# Patient Record
(demographics unavailable — no encounter records)

---
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
## 2024-12-09 NOTE — HISTORY OF PRESENT ILLNESS
[FreeTextEntry1] : ADIEL HSV [de-identified] : Santy is a 21m old F here for f/u for visit in the ED on 12/6 for HSV. Initially was seen on 12/2 at ED and was swabbed, although favored irritant dermatitis. Swab + for HSV, pt was told to start acyvlovir 200mg (5mL 4x/day) and use zinc oxide topically. Pt returned on 12/6, mom stating no improvement. Pt was set up to see us outpatient today for further evaluation. Of note, mom endorsing + primary HSV as well (cutaneous blisters/itchiness and fevers, swabbed and tx for HSV 1), 3 weeks prior to pts sx. Dad denies similar sx however does endorse recent burning on urination. Mom was tested for all other STDs and was found to be negative. Lab work shown at visit. Mom and Dad endorse that pt was unable to complete course of acyclovir as bottle with medication was not "full" and has only been taking medication for about 3 days. Parents are concerned as pt is not improving. Not having systemic sx at this time. Per mom, last fever was prior to last ED visit on 12/6.  12/6 ED VISIT :9-month-old female with past medical history of HSV vaginal rash diagnosed 2 days ago started on acyclovir 200 mg 4 times daily presenting today for increased fussiness and increased possible pain vaginal area.  Slightly decreased p.o. intake however still tolerating.  On and off fever subjectively but never measured.  No nausea or vomiting no diarrhea.  Also noticed this mass protruding from the vaginal area today therefore came into the ER.  Has been giving the antivirals as prescribed mixed with milk.  No coughing.  No difficulty breathing.  No abdominal pain.  Has been applying zinc oxide in the diaper area. Has been also applying apple cider/vinegar on the area. Vital signs within normal limits. No fever here. Physical exam shows well-appearing female in no acute distress.  No acute respiratory distress noted.  Abdomen soft and nontender.   exam shows HSV lesions around the vaginal area and also on the buttocks.  There is a erythematous mass protruding from vaginal introitus.  Patient most likely uncomfortable from the rash itself is can be painful.  Will instruct to change diapers as much as they can.  Introital mass seems to be clitoral hameed swollen due to HSV lesion affecting the area. There is wide involvement of the HSV lesions around the vaginal area and also on the buttocks. Instructed parent to not use the vinegar/apple cider to clean the area. Instead instructed to use soapy water. Will have her continue to take acyclovir and f/u with derm.
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
## 2024-12-09 NOTE — HISTORY OF PRESENT ILLNESS
[FreeTextEntry1] : ADIEL HSV [de-identified] : Santy is a 21m old F here for f/u for visit in the ED on 12/6 for HSV. Initially was seen on 12/2 at ED and was swabbed, although favored irritant dermatitis. Swab + for HSV, pt was told to start acyvlovir 200mg (5mL 4x/day) and use zinc oxide topically. Pt returned on 12/6, mom stating no improvement. Pt was set up to see us outpatient today for further evaluation. Of note, mom endorsing + primary HSV as well (cutaneous blisters/itchiness and fevers, swabbed and tx for HSV 1), 3 weeks prior to pts sx. Dad denies similar sx however does endorse recent burning on urination. Mom was tested for all other STDs and was found to be negative. Lab work shown at visit. Mom and Dad endorse that pt was unable to complete course of acyclovir as bottle with medication was not "full" and has only been taking medication for about 3 days. Parents are concerned as pt is not improving. Not having systemic sx at this time. Per mom, last fever was prior to last ED visit on 12/6.  12/6 ED VISIT :9-month-old female with past medical history of HSV vaginal rash diagnosed 2 days ago started on acyclovir 200 mg 4 times daily presenting today for increased fussiness and increased possible pain vaginal area.  Slightly decreased p.o. intake however still tolerating.  On and off fever subjectively but never measured.  No nausea or vomiting no diarrhea.  Also noticed this mass protruding from the vaginal area today therefore came into the ER.  Has been giving the antivirals as prescribed mixed with milk.  No coughing.  No difficulty breathing.  No abdominal pain.  Has been applying zinc oxide in the diaper area. Has been also applying apple cider/vinegar on the area. Vital signs within normal limits. No fever here. Physical exam shows well-appearing female in no acute distress.  No acute respiratory distress noted.  Abdomen soft and nontender.   exam shows HSV lesions around the vaginal area and also on the buttocks.  There is a erythematous mass protruding from vaginal introitus.  Patient most likely uncomfortable from the rash itself is can be painful.  Will instruct to change diapers as much as they can.  Introital mass seems to be clitoral hameed swollen due to HSV lesion affecting the area. There is wide involvement of the HSV lesions around the vaginal area and also on the buttocks. Instructed parent to not use the vinegar/apple cider to clean the area. Instead instructed to use soapy water. Will have her continue to take acyclovir and f/u with derm.

## 2024-12-09 NOTE — HISTORY OF PRESENT ILLNESS
[FreeTextEntry1] : ADIEL HSV [de-identified] : Santy is a 21m old F here for f/u for visit in the ED on 12/6 for HSV. Initially was seen on 12/2 at ED and was swabbed, although favored irritant dermatitis. Swab + for HSV, pt was told to start acyvlovir 200mg (5mL 4x/day) and use zinc oxide topically. Pt returned on 12/6, mom stating no improvement. Pt was set up to see us outpatient today for further evaluation. Of note, mom endorsing + primary HSV as well (cutaneous blisters/itchiness and fevers, swabbed and tx for HSV 1), 3 weeks prior to pts sx. Dad denies similar sx however does endorse recent burning on urination. Mom was tested for all other STDs and was found to be negative. Lab work shown at visit. Mom and Dad endorse that pt was unable to complete course of acyclovir as bottle with medication was not "full" and has only been taking medication for about 3 days. Parents are concerned as pt is not improving. Not having systemic sx at this time. Per mom, last fever was prior to last ED visit on 12/6.  12/6 ED VISIT :9-month-old female with past medical history of HSV vaginal rash diagnosed 2 days ago started on acyclovir 200 mg 4 times daily presenting today for increased fussiness and increased possible pain vaginal area.  Slightly decreased p.o. intake however still tolerating.  On and off fever subjectively but never measured.  No nausea or vomiting no diarrhea.  Also noticed this mass protruding from the vaginal area today therefore came into the ER.  Has been giving the antivirals as prescribed mixed with milk.  No coughing.  No difficulty breathing.  No abdominal pain.  Has been applying zinc oxide in the diaper area. Has been also applying apple cider/vinegar on the area. Vital signs within normal limits. No fever here. Physical exam shows well-appearing female in no acute distress.  No acute respiratory distress noted.  Abdomen soft and nontender.   exam shows HSV lesions around the vaginal area and also on the buttocks.  There is a erythematous mass protruding from vaginal introitus.  Patient most likely uncomfortable from the rash itself is can be painful.  Will instruct to change diapers as much as they can.  Introital mass seems to be clitoral hameed swollen due to HSV lesion affecting the area. There is wide involvement of the HSV lesions around the vaginal area and also on the buttocks. Instructed parent to not use the vinegar/apple cider to clean the area. Instead instructed to use soapy water. Will have her continue to take acyclovir and f/u with derm.

## 2024-12-20 NOTE — PHYSICAL EXAM
[Normal] : alert, oriented as age-appropriate, affect appropriate; no weakness, no facial asymmetry, moves all extremities normal gait-child older than 18 months [de-identified] : SEE  BELOW. NO OTHER LESIONS ANYWHERE [de-identified] :      Around 10 excoriations on both medial gluteal skin, not involving mucocutaneous borders, left more extensive than right, varying in shape and size between 0.5 to 4 centimeters. No herpetic lesions or crusting at this time.    2. A soft, clitoral area swelling that parents state is much smaller than at the ED visit on 12/6.

## 2024-12-20 NOTE — REASON FOR VISIT
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

## 2024-12-20 NOTE — HISTORY OF PRESENT ILLNESS
[FreeTextEntry2] : Mother states that Santy had a left sided buttock rash in July, which seemed at that time to be just a diaper rash but it became red and on applying the cream became worse. Stools have been fishy smelling since July, but mother says it is not diarrhea, these are not loose stools. On November 29th, December 2nd and December 6th Sant[place] [facility] [place]. HSV1 Detected on 12/2/24.  ED 12/6: Armando Amador, PGY3 - This is a 1 year 9-month-old female with past medical history of HSV vaginal rash diagnosed 2 days ago started on acyclovir 200 mg 4 times daily presenting today for increased fussiness and increased possible pain vaginal area.  Slightly decreased p.o. intake however still tolerating.  On and off fever subjectively but never measured.  No nausea or vomiting no diarrhea.  Also noticed this mass protruding from the vaginal area today therefore came into the ER.  Has been giving the antivirals as prescribed mixed with milk.  No coughing.  No difficulty breathing.  No abdominal pain.  Has been applying zinc oxide in the diaper area. Has been also applying apple cider/vinegar on the area. Vital signs within normal limits. No fever here. Physical exam shows well-appearing female in no acute distress.  No acute respiratory distress noted.  Abdomen soft and nontender.   exam shows HSV lesions around the vaginal area and also on the buttocks.  There is a erythematous mass protruding from vaginal introitus.  Patient most likely uncomfortable from the rash itself is can be painful.  Will instruct to change diapers as much as they can.  Introital mass seems to be clitoral hameed swollen due to HSV lesion affecting the area. There is wide involvement of the HSV lesions around the vaginal area and also on the buttocks. Instructed parent to not use the vinegar/apple cider to clean the area. Instead instructed to use soapy water. Will have her continue to take acyclovir and f/u with derm   Attending Contribution to Care: H&P performed with resident.  HSV rash of buttocks and genitals, mother currently with HSV flair.  Patient on acyclovir, mother doing local wound care as advised by PMD.  Noted vaginal swelling today so brought to ER.  ON exam has herpectic rash of buttocks with denuded skin and noted swelling to clitoral hameed with herpetic lesion, no signs of hair tourniquette, no urethral prolapse.  Suspoect swelling 2/2 HSV infections.  discussed continued acyclovir, wound care with peribottle rinsing, no wiping, sitz baths.  f/u pmd.  Mother at bedside contributing to history and shared decision making. KRUNAL Neal PEM Attending.   PMH: As a baby she had baby acne, and mother does not recall any suspicion of workup for herpes infection.

## 2024-12-20 NOTE — HISTORY OF PRESENT ILLNESS
[FreeTextEntry2] : Mother states that Santy had a left sided buttock rash in July, which seemed at that time to be just a diaper rash but it became red and on applying the cream became worse. Stools have been fishy smelling since July, but mother says it is not diarrhea, these are not loose stools. On November 29th, December 2nd and December 6th Santy presented [facility]. HSV1 Detected on 12/2/24.  ED 12/6: Armando Amador, PGY3 - This is a 1 year 9-month-old female with past medical history of HSV vaginal rash diagnosed 2 days ago started on acyclovir 200 mg 4 times daily presenting today for increased fussiness and increased possible pain vaginal area.  Slightly decreased p.o. intake however still tolerating.  On and off fever subjectively but never measured.  No nausea or vomiting no diarrhea.  Also noticed this mass protruding from the vaginal area today therefore came into the ER.  Has been giving the antivirals as prescribed mixed with milk.  No coughing.  No difficulty breathing.  No abdominal pain.  Has been applying zinc oxide in the diaper area. Has been also applying apple cider/vinegar on the area. Vital signs within normal limits. No fever here. Physical exam shows well-appearing female in no acute distress.  No acute respiratory distress noted.  Abdomen soft and nontender.   exam shows HSV lesions around the vaginal area and also on the buttocks.  There is a erythematous mass protruding from vaginal introitus.  Patient most likely uncomfortable from the rash itself is can be painful.  Will instruct to change diapers as much as they can.  Introital mass seems to be clitoral hameed swollen due to HSV lesion affecting the area. There is wide involvement of the HSV lesions around the vaginal area and also on the buttocks. Instructed parent to not use the vinegar/apple cider to clean the area. Instead instructed to use soapy water. Will have her continue to take acyclovir and f/u with derm   Attending Contribution to Care: H&P performed with resident.  HSV rash of buttocks and genitals, mother currently with HSV flair.  Patient on acyclovir, mother doing local wound care as advised by PMD.  Noted vaginal swelling today so brought to ER.  ON exam has herpectic rash of buttocks with denuded skin and noted swelling to clitoral hameed with herpetic lesion, no signs of hair tourniquette, no urethral prolapse.  Suspoect swelling 2/2 HSV infections.  discussed continued acyclovir, wound care with peribottle rinsing, no wiping, sitz baths.  f/u pmd.  Mother at bedside contributing to history and shared decision making. KRUNAL Neal PEM Attending.   PMH: As a baby she had baby acne, and mother does not recall any suspicion of workup for herpes infection.

## 2024-12-20 NOTE — DATA REVIEWED
[Clinical lab tests/radiology test reviewed] : clinical lab tests/radiology test reviewed [Discussed case with another health care provider] : discussed case with another health care provider [Reviewed and summarized previous records] : reviewed and summarized previous records [de-identified] : HSV1 Detected on 12/2/24.  HIV, Syphilis screen negative.

## 2024-12-20 NOTE — PHYSICAL EXAM
[Normal] : alert, oriented as age-appropriate, affect appropriate; no weakness, no facial asymmetry, moves all extremities normal gait-child older than 18 months [de-identified] : SEE  BELOW. NO OTHER LESIONS ANYWHERE [de-identified] :      Around 10 excoriations on both medial gluteal skin, not involving mucocutaneous borders, left more extensive than right, varying in shape and size between 0.5 to 4 centimeters. No herpetic lesions or crusting at this time.    2. A soft, clitoral area swelling that parents state is much smaller than at the ED visit on 12/6.

## 2024-12-20 NOTE — ADDENDUM
[FreeTextEntry1] : 12/19 &12/20 - Spoke separately to dad and mom and have arranged for them to make appointments to see Dr Ha in adult ID clinic. Reminded both of them to  the acyclovir from pharmacy and begin suppressive therapy as soon as possible. Mom reports that the gluteal area is slightly better.

## 2024-12-20 NOTE — PHYSICAL EXAM
[Normal] : alert, oriented as age-appropriate, affect appropriate; no weakness, no facial asymmetry, moves all extremities normal gait-child older than 18 months [de-identified] : SEE  BELOW. NO OTHER LESIONS ANYWHERE [de-identified] :      Around 10 excoriations on both medial gluteal skin, not involving mucocutaneous borders, left more extensive than right, varying in shape and size between 0.5 to 4 centimeters. No herpetic lesions or crusting at this time.    2. A soft, clitoral area swelling that parents state is much smaller than at the ED visit on 12/6.

## 2024-12-20 NOTE — CONSULT LETTER
[Dear  ___] : Dear  [unfilled], [Courtesy Letter:] : I had the pleasure of seeing your patient, [unfilled], in my office today. [Please see my note below.] : Please see my note below. [Consult Closing:] : Thank you very much for allowing me to participate in the care of this patient.  If you have any questions, please do not hesitate to contact me. [Sincerely,] : Sincerely, [FreeTextEntry2] : *** ATTENTION Dr Benjamin please check addendum(s) at bottom of original consult note*** [FreeTextEntry3] : Manny Mattson MD Attending Physician, Infectious Diseases, [facility] Professor, [facility] [employer]/[place] [address] Tel: [phone]  Fax: [phone]

## 2024-12-20 NOTE — CONSULT LETTER
[Dear  ___] : Dear  [unfilled], [Courtesy Letter:] : I had the pleasure of seeing your patient, [unfilled], in my office today. [Please see my note below.] : Please see my note below. [Consult Closing:] : Thank you very much for allowing me to participate in the care of this patient.  If you have any questions, please do not hesitate to contact me. [Sincerely,] : Sincerely, [FreeTextEntry2] : *** ATTENTION Dr Benjamin please check addendum(s) at bottom of original consult note*** [FreeTextEntry3] : Manny Mattson MD Attending Physician, Infectious Diseases, [facility] Professor, [facility] [employer]/[place] [address] Tel: [phone]  Fax: [phone]

## 2024-12-20 NOTE — DATA REVIEWED
[Clinical lab tests/radiology test reviewed] : clinical lab tests/radiology test reviewed [Discussed case with another health care provider] : discussed case with another health care provider [Reviewed and summarized previous records] : reviewed and summarized previous records [de-identified] : HSV1 Detected on 12/2/24.  HIV, Syphilis screen negative.

## 2024-12-20 NOTE — DATA REVIEWED
[Clinical lab tests/radiology test reviewed] : clinical lab tests/radiology test reviewed [Discussed case with another health care provider] : discussed case with another health care provider [Reviewed and summarized previous records] : reviewed and summarized previous records [de-identified] : HSV1 Detected on 12/2/24.  HIV, Syphilis screen negative.

## 2024-12-20 NOTE — HISTORY OF PRESENT ILLNESS
[FreeTextEntry2] : Mother states that Santy had a left sided buttock rash in July, which seemed at that time to be just a diaper rash but it became red and on applying the cream became worse. Stools have been fishy smelling since July, but mother says it is not diarrhea, these are not loose stools. On November 29th, December 2nd and December 6th Santy presented to the [facility] ED. HSV1 Detected on 12/2/24.  ED 12/6: Armando Amador, PGY3 - This is a 1 year 9-month-old female with past medical history of HSV vaginal rash diagnosed 2 days ago started on acyclovir 200 mg 4 times daily presenting today for increased fussiness and increased possible pain vaginal area.  Slightly decreased p.o. intake however still tolerating.  On and off fever subjectively but never measured.  No nausea or vomiting no diarrhea.  Also noticed this mass protruding from the vaginal area today therefore came into the ER.  Has been giving the antivirals as prescribed mixed with milk.  No coughing.  No difficulty breathing.  No abdominal pain.  Has been applying zinc oxide in the diaper area. Has been also applying apple cider/vinegar on the area. Vital signs within normal limits. No fever here. Physical exam shows well-appearing female in no acute distress.  No acute respiratory distress noted.  Abdomen soft and nontender.   exam shows HSV lesions around the vaginal area and also on the buttocks.  There is a erythematous mass protruding from vaginal introitus.  Patient most likely uncomfortable from the rash itself is can be painful.  Will instruct to change diapers as much as they can.  Introital mass seems to be clitoral hameed swollen due to HSV lesion affecting the area. There is wide involvement of the HSV lesions around the vaginal area and also on the buttocks. Instructed parent to not use the vinegar/apple cider to clean the area. Instead instructed to use soapy water. Will have her continue to take acyclovir and f/u with derm   Attending Contribution to Care: H&P performed with resident.  HSV rash of buttocks and genitals, mother currently with HSV flair.  Patient on acyclovir, mother doing local wound care as advised by PMD.  Noted vaginal swelling today so brought to ER.  ON exam has herpectic rash of buttocks with denuded skin and noted swelling to clitoral hameed with herpetic lesion, no signs of hair tourniquette, no urethral prolapse.  Suspoect swelling 2/2 HSV infections.  discussed continued acyclovir, wound care with peribottle rinsing, no wiping, sitz baths.  f/u pmd.  Mother at bedside contributing to history and shared decision making. KRUNAL Neal PEM Attending.   PMH: As a baby she had baby acne, and mother does not recall any suspicion of workup for herpes infection.

## 2024-12-20 NOTE — CONSULT LETTER
[Dear  ___] : Dear  [unfilled], [Courtesy Letter:] : I had the pleasure of seeing your patient, [unfilled], in my office today. [Please see my note below.] : Please see my note below. [Consult Closing:] : Thank you very much for allowing me to participate in the care of this patient.  If you have any questions, please do not hesitate to contact me. [Sincerely,] : Sincerely, [FreeTextEntry2] : *** ATTENTION Dr Benjamin please check addendum(s) at bottom of original consult note*** [FreeTextEntry3] : Manny Mattson MD Attending Physician, Infectious Diseases, [facility] Professor, [facility] [employer] [place] [address] Tel: [phone]  Fax: [phone]

## 2025-01-08 NOTE — CONSULT LETTER
[Dear  ___] : Dear  [unfilled], [Courtesy Letter:] : I had the pleasure of seeing your patient, [unfilled], in my office today. [Please see my note below.] : Please see my note below. [Consult Closing:] : Thank you very much for allowing me to participate in the care of this patient.  If you have any questions, please do not hesitate to contact me. [Sincerely,] : Sincerely, [FreeTextEntry2] : *** ATTENTION Dr Benjamin please check addendum(s) at bottom of original consult note*** [FreeTextEntry3] : Manny Mattson MD Attending Physician, Infectious Diseases, [facility] Professor, [facility] [employer] [place] [address] Tel: [phone]  Fax: [phone]

## 2025-01-08 NOTE — HISTORY OF PRESENT ILLNESS
[FreeTextEntry2] :  Telehealth follow-up visit today The service was provided using 2-way audio visual platform -              The location of the patient was their home -              The location of the provider was [address] [place] [address] -              The names of all persons participating in the telehealth service and their role in the encounter: dad Pro Gonsales Father states that Santy is much better but still has slightly open wounds, that seem to reopen after every stool. because it irritates the area.  They are still using barrier cream and overall she is active and doesn't appear to be in much discomfort.  The stools are not fishy smelling anymore, and vary from 1-2 to five to six a day. They are sometimes hard, and at other times soft. Father did not have specifics on the stools.  They are currently still using the labeled acyclovir dosing of 5ML TID, and he states that the pharmacy did not call them about the new prescription, which I had prescribed at 7.5 ML BID.

## 2025-01-08 NOTE — HISTORY OF PRESENT ILLNESS
[FreeTextEntry2] :  Telehealth follow-up visit today The service was provided using 2-way audio visual platform -              The location of the patient was their home -              The location of the provider was [address] [place] [address] -              The names of all persons participating in the telehealth service and their role in the encounter: dad Pro Gonsales Father states that Santy is much better but still has slightly open wounds, that seem to reopen after every stool. because it irritates the area.  They are still using barrier cream and overall she is active and doesn't appear to be in much discomfort.  The stools are not fishy smelling anymore, and vary from 1-2 to five to six a day. They are sometimes hard, and at other times soft. Father did not have specifics on the stools.  They are currently still using the labeled acyclovir dosing of 5ML TID, and he states that the pharmacy did not call them about the new prescription, which I had prescribed at 7.5 ML BID.

## 2025-01-08 NOTE — DATA REVIEWED
[Clinical lab tests/radiology test reviewed] : clinical lab tests/radiology test reviewed [Discussed case with another health care provider] : discussed case with another health care provider [Reviewed and summarized previous records] : reviewed and summarized previous records [de-identified] : HSV1 Detected on 12/2/24.  HIV, Syphilis screen negative.

## 2025-01-08 NOTE — CONSULT LETTER
[Dear  ___] : Dear  [unfilled], [Courtesy Letter:] : I had the pleasure of seeing your patient, [unfilled], in my office today. [Please see my note below.] : Please see my note below. [Consult Closing:] : Thank you very much for allowing me to participate in the care of this patient.  If you have any questions, please do not hesitate to contact me. [Sincerely,] : Sincerely, [FreeTextEntry2] : *** ATTENTION Dr Benjamin please check addendum(s) at bottom of original consult note*** [FreeTextEntry3] : Manny Mattson MD Attending Physician, Infectious Diseases, [facility] Professor, [facility] [employer] [place] [address] Tel: [phone]  Fax: [phone]

## 2025-01-08 NOTE — DATA REVIEWED
[Clinical lab tests/radiology test reviewed] : clinical lab tests/radiology test reviewed [Discussed case with another health care provider] : discussed case with another health care provider [Reviewed and summarized previous records] : reviewed and summarized previous records [de-identified] : HSV1 Detected on 12/2/24.  HIV, Syphilis screen negative.

## 2025-01-08 NOTE — REASON FOR VISIT
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